# Patient Record
Sex: FEMALE | Race: WHITE | NOT HISPANIC OR LATINO | URBAN - METROPOLITAN AREA
[De-identification: names, ages, dates, MRNs, and addresses within clinical notes are randomized per-mention and may not be internally consistent; named-entity substitution may affect disease eponyms.]

---

## 2018-02-01 ENCOUNTER — OFFICE VISIT - HEALTHEAST (OUTPATIENT)
Dept: FAMILY MEDICINE | Facility: CLINIC | Age: 23
End: 2018-02-01

## 2018-02-01 DIAGNOSIS — S33.5XXD LUMBAR SPRAIN, SUBSEQUENT ENCOUNTER: ICD-10-CM

## 2018-02-01 ASSESSMENT — MIFFLIN-ST. JEOR: SCORE: 2038.6

## 2018-02-09 ENCOUNTER — OFFICE VISIT - HEALTHEAST (OUTPATIENT)
Dept: PHYSICAL THERAPY | Facility: REHABILITATION | Age: 23
End: 2018-02-09

## 2018-02-09 DIAGNOSIS — M25.69 DECREASED RANGE OF MOTION OF TRUNK AND BACK: ICD-10-CM

## 2018-02-09 DIAGNOSIS — M54.50 ACUTE BILATERAL LOW BACK PAIN WITHOUT SCIATICA: ICD-10-CM

## 2018-02-09 DIAGNOSIS — M62.81 GENERALIZED MUSCLE WEAKNESS: ICD-10-CM

## 2018-07-19 ENCOUNTER — OFFICE VISIT - HEALTHEAST (OUTPATIENT)
Dept: FAMILY MEDICINE | Facility: CLINIC | Age: 23
End: 2018-07-19

## 2018-07-19 DIAGNOSIS — N91.1 SECONDARY AMENORRHEA: ICD-10-CM

## 2018-07-19 DIAGNOSIS — E28.2 POLYCYSTIC OVARIES: ICD-10-CM

## 2018-07-19 LAB
FSH SERPL-ACNC: 6.2 MIU/ML
HCG SERPL-ACNC: <2 MLU/ML (ref 0–4)
HCG UR QL: NEGATIVE
PROLACTIN SERPL-MCNC: 7.7 NG/ML (ref 0–20)
SP GR UR STRIP: 1.02 (ref 1–1.03)
TSH SERPL DL<=0.005 MIU/L-ACNC: 1.33 UIU/ML (ref 0.3–5)

## 2018-07-20 ENCOUNTER — COMMUNICATION - HEALTHEAST (OUTPATIENT)
Dept: FAMILY MEDICINE | Facility: CLINIC | Age: 23
End: 2018-07-20

## 2018-07-20 LAB — TESTOST SERPL-MCNC: 64 NG/DL (ref 14–53)

## 2018-08-10 ENCOUNTER — OFFICE VISIT - HEALTHEAST (OUTPATIENT)
Dept: FAMILY MEDICINE | Facility: CLINIC | Age: 23
End: 2018-08-10

## 2018-08-10 DIAGNOSIS — E66.01 SEVERE OBESITY (BMI >= 40) (H): ICD-10-CM

## 2018-08-10 DIAGNOSIS — E28.2 POLYCYSTIC OVARIES: ICD-10-CM

## 2018-08-10 ASSESSMENT — MIFFLIN-ST. JEOR: SCORE: 2071.71

## 2018-08-10 NOTE — ASSESSMENT & PLAN NOTE
Discussed the diagnosis.  With her trying to get pregnant unsuccessful after 5 months, discussed the use of metformin for not only helping with fertility but helping with weight loss.  Patient agrees.  Side effects precautions as well as how to start the medication with 500 mg per day for the first week and slowly increasing to 1000 mg twice a day with food.  Follow-up in clinic in 3 months.  If still not pregnant in 5 months, contact clinic and will place referral to infertility clinic.

## 2018-11-12 ENCOUNTER — OFFICE VISIT - HEALTHEAST (OUTPATIENT)
Dept: FAMILY MEDICINE | Facility: CLINIC | Age: 23
End: 2018-11-12

## 2018-11-12 DIAGNOSIS — H66.92 ACUTE LEFT OTITIS MEDIA: ICD-10-CM

## 2018-11-12 DIAGNOSIS — J03.90 EXUDATIVE TONSILLITIS: ICD-10-CM

## 2018-11-12 LAB — DEPRECATED S PYO AG THROAT QL EIA: NORMAL

## 2018-11-13 LAB — GROUP A STREP BY PCR: NORMAL

## 2020-01-29 ENCOUNTER — COMMUNICATION - HEALTHEAST (OUTPATIENT)
Dept: FAMILY MEDICINE | Facility: CLINIC | Age: 25
End: 2020-01-29

## 2020-01-30 ENCOUNTER — OFFICE VISIT - HEALTHEAST (OUTPATIENT)
Dept: FAMILY MEDICINE | Facility: CLINIC | Age: 25
End: 2020-01-30

## 2020-01-30 DIAGNOSIS — Z01.818 PREOP EXAMINATION: ICD-10-CM

## 2020-01-30 DIAGNOSIS — S82.892A CLOSED FRACTURE OF LEFT ANKLE, INITIAL ENCOUNTER: ICD-10-CM

## 2020-01-30 DIAGNOSIS — Z83.2 FAMILY HISTORY OF FACTOR V LEIDEN MUTATION: ICD-10-CM

## 2020-01-30 LAB
ERYTHROCYTE [DISTWIDTH] IN BLOOD BY AUTOMATED COUNT: 10.9 % (ref 11–14.5)
HCG SERPL QL: NEGATIVE
HCT VFR BLD AUTO: 49.2 % (ref 35–47)
HGB BLD-MCNC: 16.7 G/DL (ref 12–16)
MCH RBC QN AUTO: 33 PG (ref 27–34)
MCHC RBC AUTO-ENTMCNC: 33.9 G/DL (ref 32–36)
MCV RBC AUTO: 97 FL (ref 80–100)
PLATELET # BLD AUTO: 331 THOU/UL (ref 140–440)
PMV BLD AUTO: 7.3 FL (ref 7–10)
RBC # BLD AUTO: 5.05 MILL/UL (ref 3.8–5.4)
WBC: 10.6 THOU/UL (ref 4–11)

## 2020-01-30 ASSESSMENT — MIFFLIN-ST. JEOR
SCORE: 2066.72
SCORE: 2066.72

## 2020-01-30 ASSESSMENT — PATIENT HEALTH QUESTIONNAIRE - PHQ9: SUM OF ALL RESPONSES TO PHQ QUESTIONS 1-9: 0

## 2020-01-30 NOTE — ASSESSMENT & PLAN NOTE
Patient's sister and mom have factor V leiden. Patient hasn't been previously tested. Testing has been drawn but is pending. Patient should be managed postoperatively as if she my have increased clotting risk.

## 2020-02-03 ENCOUNTER — ANESTHESIA - HEALTHEAST (OUTPATIENT)
Dept: SURGERY | Facility: CLINIC | Age: 25
End: 2020-02-03

## 2020-02-03 ENCOUNTER — SURGERY - HEALTHEAST (OUTPATIENT)
Dept: SURGERY | Facility: CLINIC | Age: 25
End: 2020-02-03

## 2020-02-03 ASSESSMENT — MIFFLIN-ST. JEOR: SCORE: 2066.72

## 2020-02-06 LAB — FACTOR 5 LEIDEN MUTAT PCR - HISTORICAL: NORMAL

## 2020-02-07 ENCOUNTER — COMMUNICATION - HEALTHEAST (OUTPATIENT)
Dept: FAMILY MEDICINE | Facility: CLINIC | Age: 25
End: 2020-02-07

## 2020-02-11 ENCOUNTER — COMMUNICATION - HEALTHEAST (OUTPATIENT)
Dept: FAMILY MEDICINE | Facility: CLINIC | Age: 25
End: 2020-02-11

## 2021-05-27 ASSESSMENT — PATIENT HEALTH QUESTIONNAIRE - PHQ9: SUM OF ALL RESPONSES TO PHQ QUESTIONS 1-9: 0

## 2021-06-01 VITALS — BODY MASS INDEX: 45.11 KG/M2 | WEIGHT: 288 LBS

## 2021-06-01 VITALS — HEIGHT: 67 IN | WEIGHT: 288.6 LBS | BODY MASS INDEX: 45.3 KG/M2

## 2021-06-01 VITALS — HEIGHT: 67 IN | BODY MASS INDEX: 44.15 KG/M2 | WEIGHT: 281.3 LBS

## 2021-06-02 VITALS — BODY MASS INDEX: 46.2 KG/M2 | WEIGHT: 293 LBS

## 2021-06-04 VITALS
DIASTOLIC BLOOD PRESSURE: 88 MMHG | HEART RATE: 98 BPM | WEIGHT: 293 LBS | BODY MASS INDEX: 50.02 KG/M2 | SYSTOLIC BLOOD PRESSURE: 112 MMHG | HEIGHT: 64 IN | OXYGEN SATURATION: 97 % | TEMPERATURE: 98.2 F

## 2021-06-04 VITALS — BODY MASS INDEX: 50.02 KG/M2 | HEIGHT: 64 IN | WEIGHT: 293 LBS

## 2021-06-05 NOTE — ANESTHESIA PREPROCEDURE EVALUATION
Anesthesia Evaluation      Patient summary reviewed     Airway   Mallampati: II  Neck ROM: full   Pulmonary - negative ROS and normal exam    breath sounds clear to auscultation                         Cardiovascular - negative ROS and normal exam  Rate: normal,         Neuro/Psych - negative ROS     Endo/Other    (+) obesity,      Comments: Morbid obesity, BMI 51    GI/Hepatic/Renal - negative ROS           Dental      Comment: Poor dentition                       Anesthesia Plan  Planned anesthetic: general LMA and peripheral nerve block  Popliteal block for postop pain  ASA 3   Induction: intravenous   Anesthetic plan and risks discussed with: patient    Post-op plan: routine recovery

## 2021-06-05 NOTE — ANESTHESIA PROCEDURE NOTES
Peripheral Block    Patient location during procedure: pre-op  Start time: 2/3/2020 2:51 PM  End time: 2/3/2020 3:01 PM  post-op analgesia per surgeon order as noted in medical record  Staffing:  Performing  Anesthesiologist: Denia Wilson MD  Preanesthetic Checklist  Completed: patient identified, site marked, risks, benefits, and alternatives discussed, timeout performed, consent obtained, airway assessed, oxygen available, suction available, emergency drugs available and hand hygiene performed  Peripheral Block  Block type: sciatic, popliteal  Prep: ChloraPrep  Patient position: supine  Patient monitoring: cardiac monitor, continuous pulse oximetry, heart rate and blood pressure  Laterality: left  Injection technique: ultrasound guided    Ultrasound used to visualize needle placement in proximity to nerve being blocked: yes   US used to visualize anesthetic spread    No Pathological Findings  Permanent ultrasound image captured for medical record  Sterile gel and probe cover used for ultrasound.  Needle  Needle type: Stimuplex   Needle gauge: 21 G  Needle length: 4 in  no peripheral nerve catheter placed  Assessment  Injection assessment: negative aspiration for heme, no paresthesia on injection and no difficulty with injection

## 2021-06-05 NOTE — PROGRESS NOTES
Preoperative Exam    Scheduled Procedure: 02/03/2020  Surgery Date:  Ankle Fracture Left  Surgery Location: [facility], fax [phone]    Surgeon:  Dr. Burgos    Assessment/Plan:     Problem List Items Addressed This Visit     BMI 50.0-59.9, adult (H)    Family history of factor V Leiden mutation     Patient's sister and mom have factor V leiden. Patient hasn't been previously tested. Testing has been drawn but is pending. Patient should be managed postoperatively as if she my have increased clotting risk.         Relevant Orders    Factor 5 Leiden Mutation by PCR      Other Visit Diagnoses     Preop examination    -  Primary    Closed fracture of left ankle, initial encounter        Relevant Orders    Beta-hCG, Qualitative, Serum (Completed)    HM2(CBC w/o Differential) (Completed)            Surgical Procedure Risk: Intermediate (reported cardiac risk generally 1-5%)  Have you had prior anesthesia?: Yes  Have you or any family members had a previous anesthesia reaction:  No  Do you or any family members have a history of a clotting or bleeding disorder?: Yes: Factor 5 mom, sister. Patient has not had any testing.  Cardiac Risk Assessment: no increased risk for major cardiac complications    APPROVAL GIVEN to proceed with proposed procedure, without further diagnostic evaluation    Please note: Surgeon should be aware patient has a strong family history of factor V Leiden, increasing her risk for blood clot. The patient has not been previously tested. I have ordered testing but results will not be available prior to surgery.    Functional Status: Independent  Patient plans to recover at home with family.     Subjective:      Elizabeth Hernandez is a 24 y.o. female who presents for a preoperative consultation.  She is undergoing surgery for a broken ankle.    All other systems reviewed and are negative, other than those listed in the HPI.    Pertinent History  Do you have difficulty breathing or chest pain  after walking up a flight of stairs: No  History of obstructive sleep apnea: No  Steroid use in the last 6 months: No  Frequent Aspirin/NSAID use: No  Prior Blood Transfusion: No  Prior Blood Transfusion Reaction: No  If for some reason prior to, during or after the procedure, if it is medically indicated, would you be willing to have a blood transfusion?:  There is no transfusion refusal.    No current outpatient medications on file.     No current facility-administered medications for this visit.         No Known Allergies    Patient Active Problem List   Diagnosis     Menorrhagia     Allergic Rhinitis     Polycystic Ovarian Syndrome     Vitamin D Deficiency     Major depressive disorder, recurrent episode, mild (H)     BMI 50.0-59.9, adult (H)     Family history of factor V Leiden mutation       History reviewed. No pertinent past medical history.    Past Surgical History:   Procedure Laterality Date     WISDOM TOOTH EXTRACTION         Social History     Socioeconomic History     Marital status:      Spouse name: Not on file     Number of children: Not on file     Years of education: Not on file     Highest education level: Not on file   Occupational History     Not on file   Social Needs     Financial resource strain: Not on file     Food insecurity:     Worry: Not on file     Inability: Not on file     Transportation needs:     Medical: Not on file     Non-medical: Not on file   Tobacco Use     Smoking status: Current Every Day Smoker     Packs/day: 1.00     Smokeless tobacco: Never Used   Substance and Sexual Activity     Alcohol use: No     Drug use: No     Sexual activity: Yes     Partners: Male     Birth control/protection: None   Lifestyle     Physical activity:     Days per week: Not on file     Minutes per session: Not on file     Stress: Not on file   Relationships     Social connections:     Talks on phone: Not on file     Gets together: Not on file     Attends [employer] service: Not on file      "Active member of club or organization: Not on file     Attends meetings of clubs or organizations: Not on file     Relationship status: Not on file     Intimate partner violence:     Fear of current or ex partner: Not on file     Emotionally abused: Not on file     Physically abused: Not on file     Forced sexual activity: Not on file   Other Topics Concern     Not on file   Social History Narrative     Not on file       Patient Care Team:  Bolivar Sheffield DO as PCP - General  Bolivar Sheffield DO Restad, Christopher Orren, DO as Assigned PCP      Objective:     Vitals:    01/30/20 1439   BP: 112/88   Pulse: 98   Temp: 98.2  F (36.8  C)   TempSrc: Oral   SpO2: 97%   Weight: (!) 298 lb (135.2 kg)   Height: 5' 4\" (1.626 m)   PainSc:   6   PainLoc: Ankle   LMP: 01/27/2020         Physical Exam:  Physical Exam  Constitutional:       General: She is not in acute distress.     Appearance: She is not ill-appearing.   HENT:      Mouth/Throat:      Mouth: Mucous membranes are moist.      Pharynx: Oropharynx is clear.   Eyes:      Conjunctiva/sclera: Conjunctivae normal.   Neck:      Musculoskeletal: No neck rigidity.   Cardiovascular:      Rate and Rhythm: Normal rate and regular rhythm.      Heart sounds: No murmur.   Pulmonary:      Effort: Pulmonary effort is normal.      Breath sounds: Normal breath sounds. No wheezing or rhonchi.   Abdominal:      General: Abdomen is flat. Bowel sounds are normal. There is no distension.      Tenderness: There is no abdominal tenderness.   Lymphadenopathy:      Cervical: No cervical adenopathy.   Psychiatric:         Mood and Affect: Mood normal.         Labs:  Recent Results (from the past 24 hour(s))   Beta-hCG, Qualitative, Serum    Collection Time: 01/30/20  3:01 PM   Result Value Ref Range    Beta hCG Qualitative Negative Negative   HM2(CBC w/o Differential)    Collection Time: 01/30/20  3:01 PM   Result Value Ref Range    WBC 10.6 4.0 - 11.0 thou/uL    RBC " 5.05 3.80 - 5.40 mill/uL    Hemoglobin 16.7 (H) 12.0 - 16.0 g/dL    Hematocrit 49.2 (H) 35.0 - 47.0 %    MCV 97 80 - 100 fL    MCH 33.0 27.0 - 34.0 pg    MCHC 33.9 32.0 - 36.0 g/dL    RDW 10.9 (L) 11.0 - 14.5 %    Platelets 331 140 - 440 thou/uL    MPV 7.3 7.0 - 10.0 fL       Immunization History   Administered Date(s) Administered     Dtap 05/08/1998     HPV Quadrivalent 05/29/2013, 08/12/2013, 08/01/2014     Influenza,seasonal, Inj IIV3 02/09/2007, 11/20/2008     MMR 02/07/2005     POLIO, Unspecified 05/08/1998     Td, Adult, Absorbed 02/07/2005     Tdap 05/29/2013     Varicella 09/15/1997           Electronically signed by Peggy Arreaga MD 01/31/20 2:44 PM

## 2021-06-05 NOTE — ANESTHESIA CARE TRANSFER NOTE
Last vitals:   Vitals:    02/03/20 1632   BP: 145/75   Pulse: (!) 113   Resp: 22   Temp: 36.6  C (97.8  F)   SpO2: 97%     Patient's level of consciousness is drowsy  Spontaneous respirations: yes  Maintains airway independently: yes  Dentition unchanged: yes  Oropharynx: oropharynx clear of all foreign objects    QCDR Measures:  ASA# 20 - Surgical Safety Checklist: WHO surgical safety checklist completed prior to induction    PQRS# 430 - Adult PONV Prevention: 4558F - Pt received => 2 anti-emetic agents (different classes) preop & intraop  ASA# 8 - Peds PONV Prevention: NA - Not pediatric patient, not GA or 2 or more risk factors NOT present  PQRS# 424 - Peace-op Temp Management: 4559F - At least one body temp DOCUMENTED => 35.5C or 95.9F within required timeframe  PQRS# 426 - PACU Transfer Protocol: - Transfer of care checklist used  ASA# 14 - Acute Post-op Pain: ASA14B - Patient did NOT experience pain >= 7 out of 10

## 2021-06-05 NOTE — TELEPHONE ENCOUNTER
had openings tomorrow - called and got her scheduled for tomorrow at 2:20pm.      Desire Jack, KAREN 1/29/2020 12:57 PM   [facility]

## 2021-06-05 NOTE — ANESTHESIA POSTPROCEDURE EVALUATION
Patient: Elizabeth Hernandez  Procedure(s):  OPEN REDUCTION INTERNAL FIXATION BIMALLEOLAR ANKLE FRACTURE LEFT (Left)  Anesthesia type: No value filed.    Patient location: Phase II Recovery  Last vitals:   Vitals Value Taken Time   /65 2/3/2020  5:10 PM   Temp 36.6  C (97.8  F) 2/3/2020  4:32 PM   Pulse 95 2/3/2020  5:26 PM   Resp 10 2/3/2020  5:13 PM   SpO2 94 % 2/3/2020  5:26 PM   Vitals shown include unvalidated device data.  Post vital signs: stable  Level of consciousness: awake and responds to simple questions  Post-anesthesia pain: pain controlled  Post-anesthesia nausea and vomiting: no  Pulmonary: unassisted, return to baseline  Cardiovascular: stable and blood pressure at baseline  Hydration: adequate  Anesthetic events: no    QCDR Measures:  ASA# 11 - Peace-op Cardiac Arrest: ASA11B - Patient did NOT experience unanticipated cardiac arrest  ASA# 12 - Peace-op Mortality Rate: ASA12B - Patient did NOT die  ASA# 13 - PACU Re-Intubation Rate: ASA13B - Patient did NOT require a new airway mgmt  ASA# 10 - Composite Anes Safety: ASA10A - No serious adverse event    Additional Notes:

## 2021-06-05 NOTE — TELEPHONE ENCOUNTER
New Appointment Needed  What is the reason for the visit:    Pre-Op Appt Request  When is the surgery? :  Friday, 1/31/20  Where is the surgery?:   [facility]  Who is the surgeon? :  Dr Burgos [facility]  What type of surgery is being done?: ankle surgery  Provider Preference: Any available  How soon do you need to be seen?: tomorrow  Waitlist offered?: No  Okay to leave a detailed message:  Yes

## 2021-06-15 NOTE — PROGRESS NOTES
[facility] Discharge Summary    Patient Name: Elizabeth Hernandez  Date: 6/28/2018  Referring provider: Bolivar Sheffield*  Visit Diagnosis:     ICD-10-CM    1. Acute bilateral low back pain without sciatica M54.5    2. Generalized muscle weakness M62.81    3. Decreased range of motion of trunk and back R29.898        Goal Status:  See status in note below.    Patient was seen for 1 visit.  The patient attended therapy initially, but did not finish the therapy sessions prescribed as pt did not return for f/u.    Therapy will be discontinued at this time.  The patient will need a new referral to resume.    Thank you for your referral.  Yudi Benito PT, DPT, OCS, CLT  6/28/2018   2:54 PM      [facility]   Initial Evaluation    Patient Name: Elizabeth Hernandez  Date of evaluation: 2/9/2018  Visit number: 1/8  Referring Provider: Bolivar Sheffield*  Referring Diagnosis: Lumbar strain  Visit Diagnosis:     ICD-10-CM    1. Acute bilateral low back pain without sciatica M54.5    2. Generalized muscle weakness M62.81    3. Decreased range of motion of trunk and back R29.898        Assessment:        Elizabeth Hernandez is a 22 y.o. female who presents to therapy today with chief complaints of low back pain. Onset date of sx was last month when pt had a very bad cold with cough and coughing started to make her low back hurt.  Pain symptoms are mild to intense in center of low back.  Functional impairments include difficulty with sitting, transitional movement like sit to stand and bending forward for dressing and ADLs.  Pt demo's signs and sx consistent with discogenic irritation with extension bias preference, decreased trunk and core strength and decreased trunk ROM.     Pt. is appropriate for skilled PT intervention as outlined in the Plan of Care (POC).  Pt. is a good candidate for skilled PT services to improve pain levels and function.    Goals:  Pt. will demonstrate/verbalize independence in  self-management of condition in : 12 weeks  Pt. will have improved quality of sleep: with less pain;Comment;in 12 weeks  Comment:: falling asleep with less pain and difficulty  Pt. will bend: to dress;with less pain;with less difficulty;in 12 weeks  Patient will sit: 60 minutes;for driving;for eating;for watching TV;with less pain;with less difficultty  Patient will decrease : ANGELINA score;by _ points;for improved quality of function;for improved quality of life;in 12 weeks  by ___ points: 6    Patient's expectations/goals are realistic.    Barriers to Learning or Achieving Goals:  No Barriers.       Plan / Patient Instructions:      Plan for next visit: Assess response to prone progressive stretching.  Reassess jt mobility and lumbar tone and perform MT if needed.  Progress core and glut strength as pain decreases.  Give trunk strengthening program.    Plan of Care:   Communication with: Referral Source  Patient Related Instruction: Nature of Condition;Treatment plan and rationale;Self Care instruction;Basis of treatment;Body mechanics;Posture;Precautions;Next steps;Expected outcome  Times per Week: 1  Number of Weeks: 8  Number of Visits: 8  Discharge Planning: when indicated  Precautions / Restrictions : none  Therapeutic Exercise: ROM;Stretching;Strengthening  Neuromuscular Reeducation: posture;TNE;core;other  Neuromuscular Re-education: neurodynamics  Manual Therapy: soft tissue mobilization;joint mobilization;muscle energy  Functional Training (ADL's): self care;ADL's      Treatment techniques, plan of care, and goals were discussed with the patient.  The patient agrees to the plan as outlined.  The plan of care is dynamic and will be modified on an ongoing basis.       Subjective:       Social information:   Occupation:   Work Status:Working part time    History of Present Illness:  Pt reports having a super bad cough and started getting low back pain about 1 month ago.  Cough is now gone but pain  still bothers. Sitting is super sore and gets uncomfortable. Sit to stand and bending also get sore.    Pt reports taking methocarbamol and using heat to help with pain.     Pain Ratin}  Pain rating at best: 2  Pain rating at worst: 8  Pain description: aching and sharp    Patient reports benefit from:  pain medication, heat       Objective:      Patient Outcome Measures :    Modified Oswestry Low Back Pain Disablity Questionnaire  in %: 20   Scores range from 0-100%, where a score of 0% represents minimal pain and maximal function. The minimal clinically important difference is a score reduction of 12%.    Precautions/Restrictions: None  Involved side: Bilateral  Posture Observation:      General sitting posture is  fair.  Gait: Amb WNL without increase in back pain    Palpation: P/A hypo with pain L2-4, mod tone L>R lumbar paraspinals    ROM: Trunk flexion sig limited with fingertips to knees (pt can usually reach to toes), ext WNL without pain - can give relief, B sidebend WNL without pain    Strength: B LE grossly 5/5, hip ext B 4/5 without increase in pain    Sensation: Intact to light touch    Special tests: Positive SLR at 90   , decrease in back sx with repeated extension, increased pain with repeated flexion    Treatment Today      TREATMENT MINUTES COMMENTS   Evaluation 20 Lumbar   Self-care/ Home management     Manual therapy     Neuromuscular Re-education     Therapeutic Activity     Therapeutic Exercises 24 Discussed eval findings and POC.  Performed prone progression with discussion of how exercises helpful.  HEP per pt instructions and printed.   Gait training     Modality__________________                Total 44    Blank areas are intentional and mean the treatment did not include these items.        PT Evaluation Code: (Please list factors)  Patient History/Comorbidities: none  Examination: lumbar  Clinical Presentation: stable  Clinical Decision Making: low    Patient History/  Comorbidities  Examination  (body structures and functions, activity limitations, and/or participation restrictions) Clinical Presentation Clinical Decision Making (Complexity)   No documented Comorbidities or personal factors 1-2 Elements Stable and/or uncomplicated Low   1-2 documented comorbidities or personal factor 3 Elements Evolving clinical presentation with changing characteristics Moderate   3-4 documented comorbidities or personal factors 4 or more Unstable and unpredictable High       Yudi Benito PT, DPT, OCS, CLT  2/9/2018  11:12 AM

## 2021-06-15 NOTE — PROGRESS NOTES
Assessment:     1. Lumbar sprain, subsequent encounter  methocarbamol (ROBAXIN) 500 MG tablet    Ambulatory referral to PT/OT       Return in about 6 weeks (around 3/15/2018), or if symptoms worsen or fail to improve.    Plan:     Lumbar Strain  Home exercise discussed and demonstrated.  Will move over to methocarbamol as per orders.  Side effects precautions discussed.  Also referral to physical therapy for strengthening, decrease pain, increased mobility, and decrease recurrence.  Follow-up in 6-8 weeks if not improving, sooner if warning signs symptoms including fevers, chills, loss of bowel or bladder, or numbness in the perineal area    Insomnia  Patient has been using trazodone on and off.  When she uses she does sleep through the night but does have some nightmares she has not been using melatonin.  We discussed the use of melatonin 3-5 mg every night.  Side effects precautions discussed.    I have had an Advance Directives discussion with the patient.  The following are part of a depression follow up plan for the patient:  coping support assessment  The following high BMI interventions were performed this visit: encouragement to exercise and weight monitoring  I have counseled the patient for tobacco cessation and the follow up will occur  at the next visit.    Subjective:       22 y.o. female presents for evaluation lower back pain.  Pain does not radiate.  It has been present for at least 3-4 weeks.  It gets worse while she is working.  She states if she actually sits straight it feels better, but then she relaxes her back it intensifies.  No radiation of pain.  Normally most evident while sitting.  Some nights it is uncomfortable when laying flat.  Denies any loss of bowel or bladder or numbness in the perineum.  Denies any trauma or recent falls.  Her occupation is as a [employer] and she spends majority of her day on her feet.  She has tried ibuprofen with some mild help but nothing significant.    The  "following portions of the patient's history were reviewed and updated as appropriate: allergies, current medications, past family history, past medical history, past social history, past surgical history and problem list.    Review of Systems  A 12 point comprehensive review of systems was negative except as noted.     History   Smoking Status     Current Every Day Smoker   Smokeless Tobacco     Never Used       Objective:      /78 (Patient Site: Left Arm, Patient Position: Sitting, Cuff Size: Adult Large)  Pulse 72  Temp 98.4  F (36.9  C) (Oral)   Resp 12  Ht 5' 7\" (1.702 m)  Wt (!) 281 lb 4.8 oz (127.6 kg)  LMP 01/06/2018 (Exact Date)  Breastfeeding? No  BMI 44.06 kg/m2  General appearance: alert, appears stated age and cooperative  Head: Normocephalic, without obvious abnormality, atraumatic  Lungs: clear to auscultation bilaterally  Heart: regular rate and rhythm, S1, S2 normal, no murmur, click, rub or gallop  Extremities: 5/5 strength in knee, ankle, great toe flexion and extension.  Negative seated and straight leg raise.  Mild tender palpation in the paravertebrals around L4-5 and along the left SI joint.  Pulses: 2+ and symmetric  Neurologic: Reflexes: 2/4 and Achilles and patellar region bilaterally.       This note has been dictated using voice recognition software. Any grammatical or context distortions are unintentional and inherent to the software  "

## 2021-06-16 PROBLEM — D68.51 HETEROZYGOUS FACTOR V LEIDEN MUTATION (H): Status: ACTIVE | Noted: 2020-01-31

## 2021-06-19 NOTE — PROGRESS NOTES
Assessment/Plan:    Elizabeth was seen today for amenorrhea.    Diagnoses and all orders for this visit:    Secondary amenorrhea/Polycystic Ovarian Syndrome: The patient does not recall that she has a history of polycystic ovarian syndrome.  She has had regular periods.  She does have a sibling with PCO S.  Amenorrhea is not the result of a viable pregnancy given the negative urine pregnancy test today.  Following labs were ordered to evaluate pituitary gland function.  If these laboratory tests are normal, we will wait an additional month before additional testing.  The patient to return to clinic in 1 month if she passes into August without having a menstrual bleed.  -     Beta-hCG, Quantitative  -     Follicle Stimulating Hormone (FSH)  -     Prolactin  -     Thyroid Stimulating Hormone (TSH)  -     Testosterone, Total    Return in about 4 weeks (around 8/16/2018) for recheck follow-up visit if no period..    Juan David Carreon MD  _______________________________    Chief Complaint   Patient presents with     Amenorrhea     LMP: 04/10/2018     Subjective: Elizabeth Hernandez is a 23 y.o. year old female who I have not seen in clinic before who presents with the following acute complaint(s):    Amenorrhea:   -Patient presents to clinic with concerns of amenorrhea.  Her  been trying to have a baby for the past 7 months.  They have had regular unprotected sex and effort to conceive a child.  She says that she feels tired which is unusual for her.  She has taken 2 urine pregnancy test at home both of which have been negative.  She said some mild nausea as well.  She has never been pregnant before.  No concerns about nipple discharge or breast discomfort.  No abdominal pain.  Approximately 30 days after her last normal.  She had a 3 day time period in which she had light spotting.  Since that time she has had no vaginal bleeding or vaginal discharge.    ROS: Complete review of systems obtained.  Pertinent items are  listed above.     The following portions of the patient's history were reviewed and updated as appropriate: allergies, current medications, past medical history, past social history and problem list.     Objective:   /70 (Patient Site: Left Arm, Patient Position: Sitting, Cuff Size: Adult Large)  Pulse 100  Wt (!) 288 lb (130.6 kg)  LMP 04/10/2018  Breastfeeding? No  BMI 45.11 kg/m2  General: No acute distress  Cardiac: Regular rate and rhythm, normal S1/S2, no murmurs of the gallops.  Respiratory: Auscultation bilaterally.  Abdomen: Soft, nontender, nondistended including the suprapubic area.  Psych: Normal affect.    Recent Results (from the past 24 hour(s))   Pregnancy, Urine   Result Value Ref Range    Pregnancy Test, Urine Negative Negative    Specific Gravity, UA 1.020 1.001 - 1.030     No results found.    Additional History from Old Records Summarized (2): no  Decision to Obtain Records (1): no  Radiology Tests Summarized or Ordered (1): no  Labs Reviewed or Ordered (1): yes  Medicine Test Summarized or Ordered (1): no  Independent Review of EKG or X-RAY(2 each): no    This note has been dictated using voice recognition software. Any grammatical or context distortions are unintentional and inherent to the software

## 2021-06-19 NOTE — PROGRESS NOTES
Assessment/Plan:     Problem List Items Addressed This Visit     Polycystic Ovarian Syndrome - Primary     Discussed the diagnosis.  With her trying to get pregnant unsuccessful after 5 months, discussed the use of metformin for not only helping with fertility but helping with weight loss.  Patient agrees.  Side effects precautions as well as how to start the medication with 500 mg per day for the first week and slowly increasing to 1000 mg twice a day with food.  Follow-up in clinic in 3 months.  If still not pregnant in 5 months, contact clinic and will place referral to infertility clinic.         Relevant Medications    metFORMIN (GLUCOPHAGE) 500 MG tablet    Severe obesity (BMI >= 40) (H)     Diet and exercise discussed.               Return in about 3 months (around 11/10/2018) for Recheck.    The following are part of a depression follow up plan for the patient:  coping support assessment  The following high BMI interventions were performed this visit: encouragement to exercise and weight monitoring    Subjective:       23 y.o. female presents for evaluation infertility.  She recently had an anovulatory.  But then did have a period which she state was a little wide in the low longer bleeding than normal, but she did have a.  And it just ended yesterday.  She had more bleeding than normal but less cramping.  Her  have been trying for the past 8 months, she is only been off birth control for the past 5 months, for pregnancy.  There is a history of polycystic ovarian syndrome and her sister.  Patient's past labs to include cholesterol, weight, and fasting glucose all apply to her.  She has never had ovarian cysts, but she does meet the other 3 qualifications.  She did some looking up online and seems to understand a little better and further questions were answered.  She denies any chest pain, shortness breath, dyspnea exertion, palpitations, nausea or vomiting.  Denies any homicidal or suicidal ideation  "or thoughts of harming self or others.  She admits she has not been fully exercising or watching her diet, but she has been doing better over the past 3 months while she is not been successful in getting pregnant in hopes that it will help her get pregnant by improving her diet.      History     Reviewed By Date/Time Sections Reviewed    Bolivar Sheffield, DO 8/10/2018 11:03 AM Tobacco, Alcohol, Drug Use, Sexual Activity    [facility] Jr[facility] 8/10/2018 10:56 AM Tobacco          Review of Systems  Pertinent items are noted in HPI.       Objective:     Vitals:    08/10/18 1052   BP: 114/76   Pulse: 64   Resp: 16   Temp: 98.4  F (36.9  C)   TempSrc: Oral   Weight: (!) 288 lb 9.6 oz (130.9 kg)   Height: 5' 7\" (1.702 m)   LMP: 07/21/2018       Physical Exam   Constitutional: She is oriented to person, place, and time. She appears well-developed and well-nourished.   HENT:   Head: Normocephalic and atraumatic.   Cardiovascular: Normal rate, regular rhythm, normal heart sounds and intact distal pulses.    Pulmonary/Chest: Effort normal and breath sounds normal.   Neurological: She is alert and oriented to person, place, and time.   Psychiatric: She has a normal mood and affect.   Vitals reviewed.            This note has been dictated using voice recognition software. Any grammatical or context distortions are unintentional and inherent to the software  "

## 2021-06-21 NOTE — PROGRESS NOTES
ASSESSMENT/PLAN:   1. Acute left otitis media  amoxicillin (AMOXIL) 875 MG tablet   2. Exudative tonsillitis  Rapid Strep A Screen-Throat swab    Group A Strep, RNA Direct Detection, Throat    amoxicillin (AMOXIL) 875 MG tablet    dexamethasone (DECADRON) 6 MG tablet     Patient appears well and is tolerating oral intake. No signs of peritonsillar or retropharyngeal abscess on exam. Clear lungs. Strep test negative however exudative tonsillitis and LOM present, prescription for amoxicillin sent to pharmacy.  A single dose of dexamethasone is also ordered for symptomatic relief given the appearance of her throat.  Symptomatic cares advised.    At the end of the encounter, I discussed results, diagnosis, medications. Discussed red flags for immediate return to clinic/ER, as well as indications for follow up if no improvement. Please view below patient instructions for patient education and return precautions as were discussed during visit.  Patient/parent  understood and agreed to plan. Patient was stable for discharge.      Patient Instructions:  Patient Instructions    We will treat you with a course of antibiotics for both your throat and your ear infection. Please complete the full course of antibiotics. Please take with food. Take a probiotic or eat a Greek yogurt daily while you are on the antibiotic. You will be contagious until you have completed 24 hours of the medication.  Please discard your toothbrush and replace with a new toothbrush to avoid reinfection.    I did also prescribe a single dose of a steroid to help with the pain in your throat.    Please use Tylenol 650mg every 4 hours or ibuprofen 600mg every 6 hours by mouth for pain/fever.  Do not exceed 4000mg of acetaminophen or 2400mg of ibuprofen from any source in a 24 hour period.  Taking Tylenol and ibuprofen together may be helpful in reducing pain.      May use salt water gargles and hot teas for comfort. Dissolve one teaspoon of salt in one cup  of warm water to make a salt water gargle.    Watch for resolution of symptoms in the next few days. If you continue to have fevers, begin to have difficulty swallowing or breathing, notice neck pain or difficulty moving your neck, please return to clinic or present to the ER immediately.  Otherwise, follow up with your PCP as needed.        SUBJECTIVE:   Elizabeth Hernandez is a 23 y.o. female  who presents today for evaluation of 2 days of sore throat with subjective fever as well as left ear pain.  Patient denies dysphagia.  No measured fever, reports intermittent sweats and chills with associated body aches.  No nausea, no vomiting.  No rashes.  Denies cough, congestion, runny nose.  Denies abdominal pain.  Does note a distant history of mono with similar presentation.    Past Medical History:  Patient Active Problem List   Diagnosis     Menorrhagia     Allergic Rhinitis     Polycystic Ovarian Syndrome     Vitamin D Deficiency     Major depressive disorder, recurrent episode, mild (H)     Severe obesity (BMI >= 40) (H)       Surgical History:    Reviewed; Non-contributory    Family History:  No family history on file.    Reviewed; Non-contributory      Social History:    Social History     Tobacco Use   Smoking Status Current Every Day Smoker   Smokeless Tobacco Never Used     Current Medications:  Current Outpatient Medications on File Prior to Visit   Medication Sig Dispense Refill     metFORMIN (GLUCOPHAGE) 500 MG tablet Take 2 tablets (1,000 mg total) by mouth 2 (two) times a day with meals. 120 tablet 3     No current facility-administered medications on file prior to visit.        Allergies:   No Known Allergies    I personally reviewed patient's past medical, surgical, social, family history and allergies.    ROS:  Comprehensive 12 pt ROS completed, positives noted in HPI, otherwise negative.      OBJECTIVE:   /76   Pulse 92   Temp 98.9  F (37.2  C) (Oral)   Resp 16   Wt (!) 295 lb (133.8 kg)   LMP  09/26/2018 (Exact Date)   SpO2 98%   Breastfeeding? No   BMI 46.20 kg/m        General Appearance:  Alert, well-appearing female in NAD. Afebrile.    Integument: Warm, dry  HEENT:  Head: Atraumatic, normocephalic. Face nontraumatic.  Eyes: Conjunctiva clear, Lids normal.  Ears:  Right TM pearly, translucent, left TM erythematous and bulging. No canal erythema or edema. No mastoid tenderness. No pain with palpation over tragus.  Nose: nares patent. No erythema of nasal mucosa. No rhinorrhea.  Oropharynx:  No trismus. Significant posterior pharyngeal erythema. No palatal petechiae. 3+ tonsillar hypertrophy L>R, copious exudate. Uvula midline. Moist mucus membranes.  Neck: Supple, anterior cervical lymphadenopathy. No meningismus.  Respiratory: No distress. Lungs clear to ausculation bilaterally. No crackles, wheezes, rhonchi or stridor.  Cardiovascular: Regular rate and rhythm, no murmur, rub or gallop. No obvious chest wall deformities. Peripheral pulses 2+ bilaterally. No peripheral edema.  GI: Soft, nontender, normal bowel sounds. No masses, organomegaly, rigidity, or guarding.           Radiology:  I personally ordered and viewed this study. I agree with below radiology findings.    none    Laboratory Studies:  I personally ordered and interpreted these studies.    Results for orders placed or performed in visit on 11/12/18   Rapid Strep A Screen-Throat swab   Result Value Ref Range    Rapid Strep A Antigen No Group A Strep detected, presumptive negative No Group A Strep detected, presumptive negative         Trish Martin, CNP

## 2021-06-26 ENCOUNTER — HEALTH MAINTENANCE LETTER (OUTPATIENT)
Age: 26
End: 2021-06-26

## 2021-07-03 NOTE — ADDENDUM NOTE
Addendum Note by Jhonathan Leary CRNA at 2/3/2020  7:29 PM     Author: Jhonathan Leary CRNA Service: -- Author Type: Nurse Anesthetist    Filed: 2/3/2020  7:29 PM Date of Service: 2/3/2020  7:29 PM Status: Signed    : Jhonathan Leary CRNA (Nurse Anesthetist)       Addendum  created 02/03/20 1929 by Jhonathan Leary CRNA    Intraprocedure Meds edited

## 2021-10-16 ENCOUNTER — HEALTH MAINTENANCE LETTER (OUTPATIENT)
Age: 26
End: 2021-10-16

## 2022-07-17 ENCOUNTER — HEALTH MAINTENANCE LETTER (OUTPATIENT)
Age: 27
End: 2022-07-17

## 2022-09-25 ENCOUNTER — HEALTH MAINTENANCE LETTER (OUTPATIENT)
Age: 27
End: 2022-09-25

## 2023-08-06 ENCOUNTER — HEALTH MAINTENANCE LETTER (OUTPATIENT)
Age: 28
End: 2023-08-06